# Patient Record
Sex: FEMALE | Race: WHITE | NOT HISPANIC OR LATINO | Employment: UNEMPLOYED | URBAN - METROPOLITAN AREA
[De-identification: names, ages, dates, MRNs, and addresses within clinical notes are randomized per-mention and may not be internally consistent; named-entity substitution may affect disease eponyms.]

---

## 2021-04-13 ENCOUNTER — OFFICE VISIT (OUTPATIENT)
Dept: OBGYN CLINIC | Facility: CLINIC | Age: 36
End: 2021-04-13
Payer: COMMERCIAL

## 2021-04-13 VITALS
DIASTOLIC BLOOD PRESSURE: 82 MMHG | WEIGHT: 127 LBS | HEIGHT: 65 IN | SYSTOLIC BLOOD PRESSURE: 120 MMHG | BODY MASS INDEX: 21.16 KG/M2

## 2021-04-13 DIAGNOSIS — B37.9 CANDIDIASIS: Primary | ICD-10-CM

## 2021-04-13 PROBLEM — Z30.41 SURVEILLANCE FOR BIRTH CONTROL, ORAL CONTRACEPTIVES: Status: ACTIVE | Noted: 2021-04-13

## 2021-04-13 PROCEDURE — 99213 OFFICE O/P EST LOW 20 MIN: CPT | Performed by: OBSTETRICS & GYNECOLOGY

## 2021-04-13 RX ORDER — NORETHINDRONE ACETATE AND ETHINYL ESTRADIOL 1.5-30(21)
1 KIT ORAL DAILY
COMMUNITY
End: 2021-04-13

## 2021-04-13 RX ORDER — DESOXIMETASONE 2.5 MG/G
OINTMENT TOPICAL 2 TIMES DAILY
Qty: 15 G | Refills: 1 | Status: SHIPPED | OUTPATIENT
Start: 2021-04-13 | End: 2021-08-10 | Stop reason: ALTCHOICE

## 2021-04-13 RX ORDER — FLUCONAZOLE 150 MG/1
150 TABLET ORAL ONCE
Qty: 2 TABLET | Refills: 0 | Status: SHIPPED | OUTPATIENT
Start: 2021-04-13 | End: 2021-04-13

## 2021-04-13 NOTE — PATIENT INSTRUCTIONS
Diflucan once today, repeat in 4 days if needed  Topical ointment as needed up to twice daily for irritation

## 2021-04-13 NOTE — PROGRESS NOTES
Assessment/Plan:    Candidiasis  Vulvar candidiasis, diflucan x 2 and topicort rx sent       Diagnoses and all orders for this visit:    Candidiasis  -     fluconazole (DIFLUCAN) 150 mg tablet; Take 1 tablet (150 mg total) by mouth once for 1 dose Repeat dose in 4 days if symptoms persist  -     desoximetasone (TOPICORT) 0 25 % ointment; Apply topically 2 (two) times a day for 28 days    Other orders  -     Discontinue: norethindrone-ethinyl estradiol-iron (MICROGESTIN FE1 5/30) 1 5-30 MG-MCG tablet; Take 1 tablet by mouth daily          Subjective:      Patient ID: Carrie Blanchard is a 30 y o  female  Presents complaining of 2 days of external irritation, no discharge  No changes in medications or products  HPI    The following portions of the patient's history were reviewed and updated as appropriate: allergies, current medications, past family history, past medical history, past social history, past surgical history and problem list     Review of Systems  vulvar itching, no discharge    Objective:      /82   Ht 5' 5" (1 651 m)   Wt 57 6 kg (127 lb)   LMP 03/23/2021 (Approximate)   Breastfeeding No   BMI 21 13 kg/m²     Appears well, no apparent distress  Does not appear anxious or depressed  Pelvic: erythermatous, edematous labia minora and introitus with adherent white discharge c/w candida    Vagina normal with minimal discharge  Cervix WNL, no lesions       Physical Exam

## 2021-04-19 DIAGNOSIS — Z23 ENCOUNTER FOR IMMUNIZATION: ICD-10-CM

## 2021-08-09 NOTE — PROGRESS NOTES
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  had Vasectomy  Periods regular  Manageable  Normal cramps No other abdominal or pelvic pain  Bowel and bladder are normal  No unusual discharge  The following portions of the patient's history were reviewed and updated as appropriate: allergies, current medications, past family history, past medical history, past social history, past surgical history and problem list     Review of Systems   Constitutional: Positive for fatigue  Negative for unexpected weight change  Respiratory: Negative for shortness of breath  Cardiovascular: Negative for chest pain and palpitations  Gastrointestinal: Negative for abdominal distention, abdominal pain, constipation and diarrhea  Genitourinary: Negative for difficulty urinating, dyspareunia, dysuria, frequency, genital sores, menstrual problem, pelvic pain, urgency, vaginal bleeding, vaginal discharge and vaginal pain  Psychiatric/Behavioral: Negative  Negative for dysphoric mood  The patient is not nervous/anxious  Objective:      /62 (BP Location: Right arm, Patient Position: Sitting, Cuff Size: Standard)   Ht 5' 5" (1 651 m)   Wt 57 6 kg (127 lb)   LMP 07/19/2021   BMI 21 13 kg/m²          Physical Exam  Vitals and nursing note reviewed  Constitutional:       General: She is not in acute distress  Appearance: Normal appearance  HENT:      Head: Normocephalic and atraumatic     Pulmonary:      Effort: Pulmonary effort is normal    Chest:      Breasts: Breasts are symmetrical          Right: Normal  No mass, nipple discharge, skin change or tenderness  Left: Normal  No mass, nipple discharge, skin change or tenderness  Abdominal:      General: There is no distension  Palpations: Abdomen is soft  Tenderness: There is no abdominal tenderness  There is no guarding or rebound  Genitourinary:     General: Normal vulva  Exam position: Lithotomy position  Labia:         Right: No rash, tenderness, lesion or injury  Left: No rash, tenderness, lesion or injury  Urethra: No prolapse, urethral pain, urethral swelling or urethral lesion  Vagina: Normal  No erythema or lesions  Cervix: No cervical motion tenderness, discharge, lesion or cervical bleeding  Uterus: Normal        Adnexa: Right adnexa normal and left adnexa normal         Right: No mass or tenderness  Left: No mass or tenderness  Rectum: No mass or external hemorrhoid  Musculoskeletal:         General: Normal range of motion  Lymphadenopathy:      Upper Body:      Right upper body: No axillary adenopathy  Left upper body: No axillary adenopathy  Lower Body: No right inguinal adenopathy  No left inguinal adenopathy  Skin:     General: Skin is warm and dry  Neurological:      Mental Status: She is alert and oriented to person, place, and time  Psychiatric:         Mood and Affect: Mood normal          Behavior: Behavior normal          Thought Content:  Thought content normal          Judgment: Judgment normal

## 2021-08-09 NOTE — PATIENT INSTRUCTIONS
Pap every 3- 5 years if normal, Last 7/2019 normal/no HPV detected  sexually transmitted infection testing as indicated, exercise most days of week, obtain appropriate diet and hydration, Calcium 1000mg + 600 vit D daily, birth control as directed   Annual mammogram starting at age 36, monthly breast self exam

## 2021-08-10 ENCOUNTER — ANNUAL EXAM (OUTPATIENT)
Dept: OBGYN CLINIC | Facility: CLINIC | Age: 36
End: 2021-08-10
Payer: COMMERCIAL

## 2021-08-10 VITALS
DIASTOLIC BLOOD PRESSURE: 62 MMHG | BODY MASS INDEX: 21.16 KG/M2 | HEIGHT: 65 IN | SYSTOLIC BLOOD PRESSURE: 112 MMHG | WEIGHT: 127 LBS

## 2021-08-10 DIAGNOSIS — Z01.419 ENCOUNTER FOR GYNECOLOGICAL EXAMINATION WITHOUT ABNORMAL FINDING: Primary | ICD-10-CM

## 2021-08-10 PROCEDURE — 99395 PREV VISIT EST AGE 18-39: CPT | Performed by: NURSE PRACTITIONER

## 2022-08-18 ENCOUNTER — ANNUAL EXAM (OUTPATIENT)
Dept: OBGYN CLINIC | Facility: CLINIC | Age: 37
End: 2022-08-18
Payer: COMMERCIAL

## 2022-08-18 VITALS
DIASTOLIC BLOOD PRESSURE: 60 MMHG | HEIGHT: 66 IN | WEIGHT: 124 LBS | BODY MASS INDEX: 19.93 KG/M2 | SYSTOLIC BLOOD PRESSURE: 100 MMHG

## 2022-08-18 DIAGNOSIS — Z12.4 ENCOUNTER FOR PAPANICOLAOU SMEAR FOR CERVICAL CANCER SCREENING: ICD-10-CM

## 2022-08-18 DIAGNOSIS — Z01.419 ENCOUNTER FOR GYNECOLOGICAL EXAMINATION WITHOUT ABNORMAL FINDING: Primary | ICD-10-CM

## 2022-08-18 PROCEDURE — 0503F POSTPARTUM CARE VISIT: CPT | Performed by: NURSE PRACTITIONER

## 2022-08-18 PROCEDURE — 99395 PREV VISIT EST AGE 18-39: CPT | Performed by: NURSE PRACTITIONER

## 2022-08-18 RX ORDER — ACETAMINOPHEN 160 MG
TABLET,DISINTEGRATING ORAL
COMMUNITY
Start: 2022-06-01

## 2022-08-18 NOTE — PROGRESS NOTES
Assessment/Plan:    Pap every 3 years if normal, exercise most days of week, obtain appropriate diet and hydration, Calcium 1000mg + 600 vit D daily, Annual mammogram starting at age 36, monthly breast self exam     Diagnoses and all orders for this visit:    Encounter for gynecological examination without abnormal finding  -     Thinprep Pap and HPV mRNA E6/E7 Reflex HPV 16,18/45    Encounter for Papanicolaou smear for cervical cancer screening  -     Thinprep Pap and HPV mRNA E6/E7 Reflex HPV 16,18/45    Other orders  -     Cholecalciferol (Vitamin D3) 50 MCG (2000 UT) capsule          Subjective:      Patient ID: Andrea Almaguer is a 29 y o  female  Here for annual gyn PAP 7/2019 neg/neg Remote abn in 2009  Repeat and returned to normal   BABY  3 1/2 and 9  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  had Vasectomy  Periods regular  Manageable  Denies abdominal or pelvic pain  Bowel and bladder are normal  No unusual discharge       The following portions of the patient's history were reviewed and updated as appropriate: allergies, current medications, past family history, past medical history, past social history, past surgical history and problem list     Review of Systems   Constitutional: Negative for fatigue and unexpected weight change  Cardiovascular: Negative for chest pain  Gastrointestinal: Negative for abdominal distention, abdominal pain, constipation and diarrhea  Genitourinary: Negative for difficulty urinating, dyspareunia, dysuria, frequency, genital sores, menstrual problem, pelvic pain, urgency, vaginal bleeding, vaginal discharge and vaginal pain  Neurological: Negative for headaches  Psychiatric/Behavioral: Negative  Negative for dysphoric mood  The patient is not nervous/anxious            Objective:      /60 (BP Location: Left arm, Patient Position: Sitting, Cuff Size: Standard)   Ht 5' 6" (1 676 m)   Wt 56 2 kg (124 lb)   LMP 08/07/2022   BMI 20 01 kg/m² Physical Exam  Vitals and nursing note reviewed  Constitutional:       General: She is not in acute distress  Appearance: Normal appearance  HENT:      Head: Normocephalic and atraumatic  Pulmonary:      Effort: Pulmonary effort is normal    Chest:   Breasts: Breasts are symmetrical       Right: Normal  No mass, nipple discharge, skin change, tenderness or axillary adenopathy  Left: Normal  No mass, nipple discharge, skin change, tenderness or axillary adenopathy  Abdominal:      General: There is no distension  Palpations: Abdomen is soft  Tenderness: There is no abdominal tenderness  There is no guarding or rebound  Genitourinary:     General: Normal vulva  Exam position: Lithotomy position  Labia:         Right: No rash, tenderness, lesion or injury  Left: No rash, tenderness, lesion or injury  Urethra: No prolapse, urethral pain, urethral swelling or urethral lesion  Vagina: Normal  No erythema or lesions  Cervix: No cervical motion tenderness, discharge, lesion or cervical bleeding  Uterus: Normal        Adnexa: Right adnexa normal and left adnexa normal         Right: No mass or tenderness  Left: No mass or tenderness  Rectum: No mass or external hemorrhoid  Comments: Pap from cervix   Musculoskeletal:         General: Normal range of motion  Lymphadenopathy:      Upper Body:      Right upper body: No axillary adenopathy  Left upper body: No axillary adenopathy  Lower Body: No right inguinal adenopathy  No left inguinal adenopathy  Skin:     General: Skin is warm and dry  Neurological:      Mental Status: She is alert and oriented to person, place, and time  Psychiatric:         Mood and Affect: Mood normal          Behavior: Behavior normal          Thought Content:  Thought content normal          Judgment: Judgment normal

## 2022-08-18 NOTE — PATIENT INSTRUCTIONS
Pap every 3 years if normal, exercise most days of week, obtain appropriate diet and hydration, Calcium 1000mg + 600 vit D daily, Annual mammogram starting at age 36, monthly breast self exam

## 2022-08-22 LAB
CLINICAL INFO: NORMAL
CYTO CVX: NORMAL
DATE PREVIOUS BX: NORMAL
HPV E6+E7 MRNA CVX QL NAA+PROBE: NOT DETECTED
LMP START DATE: NORMAL
SL AMB PREV. PAP:: NORMAL
SPECIMEN SOURCE CVX/VAG CYTO: NORMAL

## 2023-02-23 ENCOUNTER — TELEPHONE (OUTPATIENT)
Dept: OBGYN CLINIC | Facility: CLINIC | Age: 38
End: 2023-02-23

## 2023-02-23 NOTE — TELEPHONE ENCOUNTER
Pt called with concerns informing she is 3 days last for her period  Pt reports her cycles are always regular to the day, cycles ocur every 29 days  Her  has had a vasectomy, and HPT  x4 have all been negative  Discussed it is not uncommon to have an "off" cycle, continue to monitor, may recheck a HPT in 1 week, if no menses in 1 month, call back to further discuss  Please review and advise

## 2023-08-22 ENCOUNTER — ANNUAL EXAM (OUTPATIENT)
Dept: OBGYN CLINIC | Facility: CLINIC | Age: 38
End: 2023-08-22
Payer: COMMERCIAL

## 2023-08-22 VITALS
HEIGHT: 65 IN | SYSTOLIC BLOOD PRESSURE: 102 MMHG | WEIGHT: 127.8 LBS | DIASTOLIC BLOOD PRESSURE: 68 MMHG | BODY MASS INDEX: 21.29 KG/M2

## 2023-08-22 DIAGNOSIS — Z01.419 GYNECOLOGIC EXAM NORMAL: Primary | ICD-10-CM

## 2023-08-22 PROCEDURE — 99395 PREV VISIT EST AGE 18-39: CPT | Performed by: PHYSICIAN ASSISTANT

## 2023-08-22 NOTE — ASSESSMENT & PLAN NOTE
Pap guidelines reviewed. Pap deferred secondary to negative pap and HPV in 2022 in this low risk patient. Return to office for annual or as needed.

## 2023-08-22 NOTE — PROGRESS NOTES
Assessment/Plan   Problem List Items Addressed This Visit        Other    Gynecologic exam normal - Primary     Pap guidelines reviewed. Pap deferred secondary to negative pap and HPV in  in this low risk patient. Return to office for annual or as needed. Subjective:     Patient ID: Vika Pichardo is a 40 y.o. y.o. female. HPI  41 yo seen for annual exam. Partner has vasectomy. Patient had an irregular cycle earlier this year but since have gone back to normal with no issues. Denies bowel or bladder issues. Last pap: 2022 NILM (-)HRHPV. The following portions of the patient's history were reviewed and updated as appropriate:   She  has a past medical history of Abnormal Pap smear of cervix (), Depression (), Miscarriage (), Papanicolaou smear (2019), Postpartum mood disturbance, Tension headache, and Varicella (). She   Patient Active Problem List    Diagnosis Date Noted   • Gynecologic exam normal 2023   • Surveillance for birth control, oral contraceptives 2021   • Candidiasis 2021     She  has a past surgical history that includes Refractive surgery (Bilateral, ). Her family history includes Diabetes in her father and maternal grandmother; Hypertension in her father; Migraines in her mother; Stroke in her paternal grandfather. She  reports that she has never smoked. She has never used smokeless tobacco. She reports current alcohol use of about 3.0 standard drinks of alcohol per week. She reports that she does not use drugs. No current outpatient medications on file. No current facility-administered medications for this visit. She has No Known Allergies. .    Menstrual History:  OB History        3    Para   2    Term   1            AB   1    Living   2       SAB   1    IAB        Ectopic        Multiple        Live Births   2                  Patient's last menstrual period was 2023 (approximate). Review of Systems   Constitutional: Negative for fatigue, fever and unexpected weight change. HENT: Negative for dental problem and sinus pressure. Eyes: Negative for visual disturbance. Respiratory: Negative for cough, shortness of breath and wheezing. Cardiovascular: Negative for chest pain. Gastrointestinal: Negative for abdominal pain, blood in stool, constipation, diarrhea, nausea and vomiting. Endocrine: Negative for polydipsia. Genitourinary: Negative for difficulty urinating, dyspareunia, dysuria, frequency, hematuria, pelvic pain and urgency. Musculoskeletal: Negative for arthralgias and back pain. Neurological: Negative for dizziness, seizures, light-headedness and headaches. Psychiatric/Behavioral: Negative for suicidal ideas. The patient is not nervous/anxious. Objective:  Vitals:    08/22/23 1312   BP: 102/68   BP Location: Left arm   Patient Position: Sitting   Cuff Size: Standard   Weight: 58 kg (127 lb 12.8 oz)   Height: 5' 5.25" (1.657 m)      Physical Exam  Constitutional:       Appearance: Normal appearance. She is well-developed. Genitourinary:      Vulva and bladder normal.      No lesions in the vagina. Right Labia: No rash, tenderness, lesions or skin changes. Left Labia: No tenderness, lesions, skin changes or rash. No labial fusion noted. No inguinal adenopathy present in the right or left side. No vaginal discharge, erythema, tenderness or bleeding. Right Adnexa: not tender, not full and no mass present. Left Adnexa: not tender, not full and no mass present. No cervical motion tenderness, discharge or lesion. Uterus is not enlarged, tender or irregular. No uterine mass detected. No urethral prolapse, tenderness or mass present. Bladder is not tender. Breasts:     Breasts are symmetrical.      Right: No swelling, bleeding, inverted nipple, mass, nipple discharge, skin change or tenderness. Left: No swelling, bleeding, inverted nipple, mass, nipple discharge, skin change or tenderness. HENT:      Head: Normocephalic and atraumatic. Neck:      Thyroid: No thyromegaly. Cardiovascular:      Rate and Rhythm: Normal rate and regular rhythm. Heart sounds: Normal heart sounds. No murmur heard. No friction rub. No gallop. Pulmonary:      Effort: Pulmonary effort is normal. No respiratory distress. Breath sounds: Normal breath sounds. No wheezing. Abdominal:      General: There is no distension. Palpations: Abdomen is soft. There is no mass. Tenderness: There is no abdominal tenderness. There is no guarding or rebound. Hernia: No hernia is present. Lymphadenopathy:      Cervical: No cervical adenopathy. Upper Body:      Right upper body: No supraclavicular, axillary or pectoral adenopathy. Left upper body: No supraclavicular, axillary or pectoral adenopathy. Lower Body: No right inguinal adenopathy. No left inguinal adenopathy. Neurological:      Mental Status: She is alert and oriented to person, place, and time. Skin:     General: Skin is warm and dry.    Psychiatric:         Behavior: Behavior normal.

## 2023-10-21 PROBLEM — Z01.419 GYNECOLOGIC EXAM NORMAL: Status: RESOLVED | Noted: 2023-08-22 | Resolved: 2023-10-21

## 2024-08-23 ENCOUNTER — ANNUAL EXAM (OUTPATIENT)
Dept: OBGYN CLINIC | Facility: CLINIC | Age: 39
End: 2024-08-23
Payer: COMMERCIAL

## 2024-08-23 VITALS
HEIGHT: 65 IN | BODY MASS INDEX: 20.76 KG/M2 | WEIGHT: 124.6 LBS | SYSTOLIC BLOOD PRESSURE: 114 MMHG | DIASTOLIC BLOOD PRESSURE: 60 MMHG

## 2024-08-23 DIAGNOSIS — Z01.419 GYNECOLOGIC EXAM NORMAL: Primary | ICD-10-CM

## 2024-08-23 PROCEDURE — 99395 PREV VISIT EST AGE 18-39: CPT | Performed by: PHYSICIAN ASSISTANT

## 2024-08-23 NOTE — PROGRESS NOTES
[facility] [address], [address], [place] [address]    ASSESSMENT/PLAN: Jovana Marrero is a 38 y.o.  who presents for annual gynecologic exam.    Encounter for routine gynecologic examination  - Routine well woman exam completed today.  - Cervical Cancer Screening: Current ASCCP Guidelines reviewed. Last Pap: 2022 NILM (-)HRHPV. History of abnormal: one abnormal , cleared on own.  - STI screening offered including HIV testing: offered, pt declined  - Contraceptive counseling discussed.  Current contraception: Partner vasectomy:       Additional problems addressed during this visit:  1. Gynecologic exam normal  Assessment & Plan:  Pap guidelines reviewed. Pap deferred secondary to negative pap and HPV in  in this low risk patient.   Return to office for annual or as needed.       CC:  Annual Gynecologic Examination    HPI: Jovana Marrero is a 38 y.o.  who presents for annual gynecologic examination. Menses regular painful cramps on the first day, will take Tylenol which helps.   Denies bowel or bladder issues.     ROS: Negative except as noted in HPI    Patient's last menstrual period was 2024.       She  reports being sexually active and has had partner(s) who are male. She reports using the following method of birth control/protection: Male Sterilization.       The following portions of the patient's history were reviewed and updated as appropriate:   Past Medical History:   Diagnosis Date    Abnormal Pap smear of cervix     Depression 2014    Miscarriage 2018    Papanicolaou smear 2019    Postpartum mood disturbance     Tension headache     Varicella      Past Surgical History:   Procedure Laterality Date    REFRACTIVE SURGERY Bilateral 2017     Family History   Problem Relation Age of Onset    Migraines Mother     Diabetes Father     Hypertension Father     Diabetes Maternal Grandmother     Stroke Paternal Grandfather     Breast cancer Neg  "Hx     Uterine cancer Neg Hx     Ovarian cancer Neg Hx     Colon cancer Neg Hx      Social History     Socioeconomic History    Marital status: /Civil Union     Spouse name: None    Number of children: 2    Years of education: None    Highest education level: None   Occupational History    Occupation: Homemaker   Tobacco Use    Smoking status: Never    Smokeless tobacco: Never   Vaping Use    Vaping status: Never Used   Substance and Sexual Activity    Alcohol use: Yes     Alcohol/week: 3.0 standard drinks of alcohol     Types: 2 Glasses of wine, 1 Cans of beer per week     Comment: socially    Drug use: Never    Sexual activity: Yes     Partners: Male     Birth control/protection: Male Sterilization   Other Topics Concern    None   Social History Narrative    Exercises occasionally    No domestic violence     Social Determinants of Health     Financial Resource Strain: Not on file   Food Insecurity: Not on file   Transportation Needs: Not on file   Physical Activity: Not on file   Stress: Not on file   Social Connections: Not on file   Intimate Partner Violence: Not on file   Housing Stability: Not on file     No outpatient medications have been marked as taking for the 8/23/24 encounter (Annual Exam) with Neelam Waite PA-C.     No Known Allergies        Objective:  /60 (BP Location: Left arm, Patient Position: Sitting, Cuff Size: Standard)   Ht 5' 5\" (1.651 m)   Wt 56.5 kg (124 lb 9.6 oz)   LMP 08/06/2024   BMI 20.73 kg/m²        Chaperone present? Offered, declines      Physical Exam  Constitutional:       Appearance: Normal appearance. She is well-developed.   Genitourinary:      Vulva and bladder normal.      No lesions in the vagina.      Right Labia: No rash, tenderness, lesions or skin changes.     Left Labia: No tenderness, lesions, skin changes or rash.     No labial fusion noted.      No inguinal adenopathy present in the right or left side.     No vaginal discharge, erythema, " tenderness or bleeding.        Right Adnexa: not tender, not full and no mass present.     Left Adnexa: not tender, not full and no mass present.     No cervical motion tenderness, discharge or lesion.      Uterus is not enlarged, tender or irregular.      No uterine mass detected.     No urethral prolapse, tenderness or mass present.      Bladder is not tender.    Breasts:     Breasts are symmetrical.      Right: No swelling, bleeding, inverted nipple, mass, nipple discharge, skin change or tenderness.      Left: No swelling, bleeding, inverted nipple, mass, nipple discharge, skin change or tenderness.   HENT:      Head: Normocephalic and atraumatic.   Neck:      Thyroid: No thyromegaly.   Cardiovascular:      Rate and Rhythm: Normal rate and regular rhythm.      Heart sounds: Normal heart sounds. No murmur heard.     No friction rub. No gallop.   Pulmonary:      Effort: Pulmonary effort is normal. No respiratory distress.      Breath sounds: Normal breath sounds. No wheezing.   Abdominal:      General: There is no distension.      Palpations: Abdomen is soft. There is no mass.      Tenderness: There is no abdominal tenderness. There is no guarding or rebound.      Hernia: No hernia is present.   Lymphadenopathy:      Cervical: No cervical adenopathy.      Upper Body:      Right upper body: No supraclavicular, axillary or pectoral adenopathy.      Left upper body: No supraclavicular, axillary or pectoral adenopathy.      Lower Body: No right inguinal adenopathy. No left inguinal adenopathy.   Neurological:      Mental Status: She is alert and oriented to person, place, and time.   Skin:     General: Skin is warm and dry.   Psychiatric:         Behavior: Behavior normal.             Neelam Waite PA-C  8/23/2024 10:12 AM